# Patient Record
Sex: MALE | Race: WHITE | NOT HISPANIC OR LATINO | URBAN - METROPOLITAN AREA
[De-identification: names, ages, dates, MRNs, and addresses within clinical notes are randomized per-mention and may not be internally consistent; named-entity substitution may affect disease eponyms.]

---

## 2017-04-13 ENCOUNTER — APPOINTMENT (RX ONLY)
Dept: URBAN - METROPOLITAN AREA CLINIC 51 | Facility: CLINIC | Age: 47
Setting detail: DERMATOLOGY
End: 2017-04-13

## 2017-04-13 DIAGNOSIS — L57.0 ACTINIC KERATOSIS: ICD-10-CM

## 2017-04-13 PROBLEM — E78.5 HYPERLIPIDEMIA, UNSPECIFIED: Status: ACTIVE | Noted: 2017-04-13

## 2017-04-13 PROBLEM — J30.1 ALLERGIC RHINITIS DUE TO POLLEN: Status: ACTIVE | Noted: 2017-04-13

## 2017-04-13 PROCEDURE — ? DIAGNOSIS COMMENT

## 2017-04-13 PROCEDURE — ? COUNSELING

## 2017-04-13 PROCEDURE — 17000 DESTRUCT PREMALG LESION: CPT

## 2017-04-13 PROCEDURE — ? LIQUID NITROGEN

## 2017-04-13 ASSESSMENT — LOCATION DETAILED DESCRIPTION DERM: LOCATION DETAILED: LEFT MEDIAL SUPERIOR CHEST

## 2017-04-13 ASSESSMENT — LOCATION SIMPLE DESCRIPTION DERM: LOCATION SIMPLE: CHEST

## 2017-04-13 ASSESSMENT — LOCATION ZONE DERM: LOCATION ZONE: TRUNK

## 2017-04-13 NOTE — PROCEDURE: LIQUID NITROGEN
Render Post-Care Instructions In Note?: yes
Duration Of Freeze Thaw-Cycle (Seconds): 2
Consent: The patient's consent was obtained including but not limited to risks of crusting, scabbing, blistering, scarring, darker or lighter pigmentary change, recurrence, incomplete removal and infection.
Number Of Freeze-Thaw Cycles: 2 freeze-thaw cycles
Detail Level: Detailed
Post-Care Instructions: I reviewed with the patient in detail post-care instructions. Patient is to wear sunprotection, and avoid picking at any of the treated lesions. Pt may apply Vaseline to crusted or scabbing areas.

## 2018-07-18 ENCOUNTER — APPOINTMENT (RX ONLY)
Dept: URBAN - METROPOLITAN AREA CLINIC 51 | Facility: CLINIC | Age: 48
Setting detail: DERMATOLOGY
End: 2018-07-18

## 2018-07-18 DIAGNOSIS — L82.1 OTHER SEBORRHEIC KERATOSIS: ICD-10-CM

## 2018-07-18 DIAGNOSIS — L57.0 ACTINIC KERATOSIS: ICD-10-CM

## 2018-07-18 PROCEDURE — 17000 DESTRUCT PREMALG LESION: CPT

## 2018-07-18 PROCEDURE — ? COUNSELING

## 2018-07-18 PROCEDURE — 99212 OFFICE O/P EST SF 10 MIN: CPT | Mod: 25

## 2018-07-18 PROCEDURE — ? LIQUID NITROGEN

## 2018-07-18 ASSESSMENT — LOCATION SIMPLE DESCRIPTION DERM
LOCATION SIMPLE: LEFT EAR
LOCATION SIMPLE: LEFT PRETIBIAL REGION

## 2018-07-18 ASSESSMENT — LOCATION ZONE DERM
LOCATION ZONE: EAR
LOCATION ZONE: LEG

## 2018-07-18 ASSESSMENT — LOCATION DETAILED DESCRIPTION DERM
LOCATION DETAILED: LEFT SUPERIOR HELIX
LOCATION DETAILED: LEFT LATERAL PROXIMAL PRETIBIAL REGION

## 2018-07-18 ASSESSMENT — PAIN INTENSITY VAS: HOW INTENSE IS YOUR PAIN 0 BEING NO PAIN, 10 BEING THE MOST SEVERE PAIN POSSIBLE?: NO PAIN

## 2018-07-18 NOTE — PROCEDURE: LIQUID NITROGEN
Duration Of Freeze Thaw-Cycle (Seconds): 5
Detail Level: Zone
Number Of Freeze-Thaw Cycles: 2 freeze-thaw cycles
Consent: The patient's verbal consent was obtained including but not limited to risks of crusting, scabbing, blistering, scarring, darker or lighter pigmentary change, recurrence, incomplete removal and infection.
Render Post-Care Instructions In Note?: yes
Post-Care Instructions: I reviewed with the patient in detail post-care instructions. Patient is to wear sunprotection, and avoid picking at any of the treated lesions. Pt may shower as normal, and apply Vaseline to crusted or scabbing areas.

## 2019-12-17 ENCOUNTER — APPOINTMENT (RX ONLY)
Dept: URBAN - METROPOLITAN AREA CLINIC 51 | Facility: CLINIC | Age: 49
Setting detail: DERMATOLOGY
End: 2019-12-17

## 2019-12-17 DIAGNOSIS — L28.0 LICHEN SIMPLEX CHRONICUS: ICD-10-CM

## 2019-12-17 DIAGNOSIS — L81.4 OTHER MELANIN HYPERPIGMENTATION: ICD-10-CM

## 2019-12-17 PROCEDURE — ? COUNSELING

## 2019-12-17 PROCEDURE — 99213 OFFICE O/P EST LOW 20 MIN: CPT

## 2019-12-17 PROCEDURE — ? PRESCRIPTION

## 2019-12-17 ASSESSMENT — LOCATION ZONE DERM
LOCATION ZONE: TRUNK
LOCATION ZONE: LEG

## 2019-12-17 ASSESSMENT — SEVERITY ASSESSMENT: SEVERITY: MILD

## 2019-12-17 ASSESSMENT — LOCATION SIMPLE DESCRIPTION DERM
LOCATION SIMPLE: RIGHT UPPER BACK
LOCATION SIMPLE: LEFT KNEE

## 2019-12-17 ASSESSMENT — LOCATION DETAILED DESCRIPTION DERM
LOCATION DETAILED: RIGHT SUPERIOR MEDIAL UPPER BACK
LOCATION DETAILED: LEFT KNEE

## 2020-12-17 ENCOUNTER — APPOINTMENT (RX ONLY)
Dept: URBAN - METROPOLITAN AREA CLINIC 51 | Facility: CLINIC | Age: 50
Setting detail: DERMATOLOGY
End: 2020-12-17

## 2020-12-17 DIAGNOSIS — L82.1 OTHER SEBORRHEIC KERATOSIS: ICD-10-CM

## 2020-12-17 DIAGNOSIS — L81.4 OTHER MELANIN HYPERPIGMENTATION: ICD-10-CM

## 2020-12-17 PROCEDURE — ? COUNSELING

## 2020-12-17 PROCEDURE — 99213 OFFICE O/P EST LOW 20 MIN: CPT

## 2020-12-17 ASSESSMENT — PAIN INTENSITY VAS: HOW INTENSE IS YOUR PAIN 0 BEING NO PAIN, 10 BEING THE MOST SEVERE PAIN POSSIBLE?: NO PAIN

## 2020-12-17 ASSESSMENT — LOCATION SIMPLE DESCRIPTION DERM
LOCATION SIMPLE: CHEST
LOCATION SIMPLE: LEFT UPPER ARM
LOCATION SIMPLE: RIGHT SHOULDER
LOCATION SIMPLE: LEFT SHOULDER
LOCATION SIMPLE: RIGHT FOREARM

## 2020-12-17 ASSESSMENT — LOCATION DETAILED DESCRIPTION DERM
LOCATION DETAILED: LEFT PROXIMAL POSTERIOR UPPER ARM
LOCATION DETAILED: STERNUM
LOCATION DETAILED: LEFT POSTERIOR SHOULDER
LOCATION DETAILED: RIGHT PROXIMAL DORSAL FOREARM
LOCATION DETAILED: RIGHT POSTERIOR SHOULDER

## 2020-12-17 ASSESSMENT — LOCATION ZONE DERM
LOCATION ZONE: TRUNK
LOCATION ZONE: ARM

## 2021-12-07 ENCOUNTER — HOSPITAL ENCOUNTER (OUTPATIENT)
Dept: HOSPITAL 35 - CAT | Age: 51
End: 2021-12-07
Payer: COMMERCIAL

## 2021-12-07 DIAGNOSIS — I25.10: ICD-10-CM

## 2021-12-07 DIAGNOSIS — E78.00: ICD-10-CM

## 2021-12-07 DIAGNOSIS — Z13.6: Primary | ICD-10-CM

## 2022-05-14 ENCOUNTER — HOSPITAL ENCOUNTER (EMERGENCY)
Dept: HOSPITAL 75 - ER | Age: 52
Discharge: HOME | End: 2022-05-14
Payer: COMMERCIAL

## 2022-05-14 VITALS — DIASTOLIC BLOOD PRESSURE: 79 MMHG | SYSTOLIC BLOOD PRESSURE: 135 MMHG

## 2022-05-14 VITALS — BODY MASS INDEX: 28.72 KG/M2 | HEIGHT: 66.93 IN | WEIGHT: 182.98 LBS

## 2022-05-14 DIAGNOSIS — N20.1: Primary | ICD-10-CM

## 2022-05-14 LAB
ALBUMIN SERPL-MCNC: 4.5 GM/DL (ref 3.2–4.5)
ALP SERPL-CCNC: 70 U/L (ref 40–136)
ALT SERPL-CCNC: 46 U/L (ref 0–55)
AMYLASE SERPL-CCNC: 61 U/L (ref 25–125)
APTT PPP: YELLOW S
BACTERIA #/AREA URNS HPF: (no result) /HPF
BARBITURATES UR QL: NEGATIVE
BASOPHILS # BLD AUTO: 0.1 10^3/UL (ref 0–0.1)
BASOPHILS NFR BLD AUTO: 1 % (ref 0–10)
BENZODIAZ UR QL SCN: NEGATIVE
BILIRUB SERPL-MCNC: 0.5 MG/DL (ref 0.1–1)
BILIRUB UR QL STRIP: NEGATIVE
BUN/CREAT SERPL: 15
CALCIUM SERPL-MCNC: 9.6 MG/DL (ref 8.5–10.1)
CHLORIDE SERPL-SCNC: 103 MMOL/L (ref 98–107)
CO2 SERPL-SCNC: 24 MMOL/L (ref 21–32)
COCAINE UR QL: NEGATIVE
CREAT SERPL-MCNC: 1.15 MG/DL (ref 0.6–1.3)
EOSINOPHIL # BLD AUTO: 0.1 10^3/UL (ref 0–0.3)
EOSINOPHIL NFR BLD AUTO: 1 % (ref 0–10)
FIBRINOGEN PPP-MCNC: CLEAR MG/DL
GFR SERPLBLD BASED ON 1.73 SQ M-ARVRAT: 78 ML/MIN
GLUCOSE SERPL-MCNC: 127 MG/DL (ref 70–105)
GLUCOSE UR STRIP-MCNC: NEGATIVE MG/DL
HCT VFR BLD CALC: 43 % (ref 40–54)
HGB BLD-MCNC: 14.4 G/DL (ref 13.3–17.7)
HYALINE CASTS #/AREA URNS LPF: (no result) /LPF
KETONES UR QL STRIP: NEGATIVE
LEUKOCYTE ESTERASE UR QL STRIP: NEGATIVE
LIPASE SERPL-CCNC: 42 U/L (ref 8–78)
LYMPHOCYTES # BLD AUTO: 2.6 10^3/UL (ref 1–4)
LYMPHOCYTES NFR BLD AUTO: 30 % (ref 12–44)
MANUAL DIFFERENTIAL PERFORMED BLD QL: NO
MCH RBC QN AUTO: 28 PG (ref 25–34)
MCHC RBC AUTO-ENTMCNC: 33 G/DL (ref 32–36)
MCV RBC AUTO: 85 FL (ref 80–99)
METHADONE UR QL SCN: NEGATIVE
MONOCYTES # BLD AUTO: 0.7 10^3/UL (ref 0–1)
MONOCYTES NFR BLD AUTO: 9 % (ref 0–12)
NEUTROPHILS # BLD AUTO: 5.1 10^3/UL (ref 1.8–7.8)
NEUTROPHILS NFR BLD AUTO: 60 % (ref 42–75)
NITRITE UR QL STRIP: NEGATIVE
OPIATES UR QL SCN: NEGATIVE
OXYCODONE UR QL: NEGATIVE
PH UR STRIP: 6 [PH] (ref 5–9)
PLATELET # BLD: 208 10^3/UL (ref 130–400)
PMV BLD AUTO: 9.7 FL (ref 9–12.2)
POTASSIUM SERPL-SCNC: 3.7 MMOL/L (ref 3.6–5)
PROPOXYPH UR QL: NEGATIVE
PROT SERPL-MCNC: 7.4 GM/DL (ref 6.4–8.2)
PROT UR QL STRIP: (no result)
RBC #/AREA URNS HPF: (no result) /HPF
SODIUM SERPL-SCNC: 143 MMOL/L (ref 135–145)
SP GR UR STRIP: >=1.03 (ref 1.02–1.02)
SQUAMOUS #/AREA URNS HPF: (no result) /HPF
TRICYCLICS UR QL SCN: NEGATIVE
WBC # BLD AUTO: 8.6 10^3/UL (ref 4.3–11)
WBC #/AREA URNS HPF: (no result) /HPF

## 2022-05-14 PROCEDURE — 36415 COLL VENOUS BLD VENIPUNCTURE: CPT

## 2022-05-14 PROCEDURE — 83690 ASSAY OF LIPASE: CPT

## 2022-05-14 PROCEDURE — 80053 COMPREHEN METABOLIC PANEL: CPT

## 2022-05-14 PROCEDURE — 80306 DRUG TEST PRSMV INSTRMNT: CPT

## 2022-05-14 PROCEDURE — 74018 RADEX ABDOMEN 1 VIEW: CPT

## 2022-05-14 PROCEDURE — 85025 COMPLETE CBC W/AUTO DIFF WBC: CPT

## 2022-05-14 PROCEDURE — 74176 CT ABD & PELVIS W/O CONTRAST: CPT

## 2022-05-14 PROCEDURE — 82150 ASSAY OF AMYLASE: CPT

## 2022-05-14 PROCEDURE — 81000 URINALYSIS NONAUTO W/SCOPE: CPT

## 2022-05-14 PROCEDURE — 87088 URINE BACTERIA CULTURE: CPT

## 2022-05-14 NOTE — ED BACK PAIN
General


Chief Complaint:  General Problems/Pain


Stated Complaint:  L SIDE LOWER BACK PAIN


Nursing Triage Note:  


PT REPORTS TO ED FOR LT SIDED FLANK PAIN THAT SUDDENLY STARTED WHILE DRIVING. 


DENIES KNOWN INJURY. STATES HE FELT LIKE PASSING OUT AND HIS VISION GOT FOGGY. 


PT REPORTS CURRENTLY FEELING BETTER THAN EARLIER. PT AMB. TO FT2 WITHOUT 


DIFFICULTY. SPOUSE AT BEDSIDE.


Source of Information:  Patient, Spouse





History of Present Illness


Date Seen by Provider:  May 14, 2022


Time Seen by Provider:  20:00


Initial Comments


PT ARRIVES VIA POV WITH WIFE


AN  HOUR AGO, WHILE DRIVING, PT HAD SUDDEN ONSET OF SEVERE LEFT FLANK PAIN 


PAIN COMES IN WAVES--RATES PAIN 10/10


NO RADIATION OF PAIN 


NOTHING WORSENS OR IMPROVES PAIN 


HAS NOT TAKEN ANYTHING FOR PAIN 


FELT LIKE HE MIGHT PASS OUT WHEN PAIN WAS SEVERE


+ NAUSEA, NO VOMITING


NO URINARY SYMPTOMS, BUT UNABLE TO VOID ON ARRIVAL


NO FEVER





NO HISTORY OF SIMILAR





PT FELT FINE ALL DAY TODAY


HAVE BEEN WATCHING SOCCER GAMES ALL DAY TODAY--VERY HOT OUTSIDE. 


HAS BEEN DRINKING WATER TODAY AND VOIDING A NORMAL AMOUNT. 





OTHER THAN HIGH CHOLESTEROL, NO CHRONIC ILLNESS


NO PRIOR ABDOMINAL SURGERIES


Other Comments





PCP: [facility]  WITH [facility] IN [place], [place]--PT LIVES IN [place]





Allergies and Home Medications


Allergies


Coded Allergies:  


     No Known Drug Allergies (Unverified , 5/14/22)





Patient Home Medication List


Home Medication List Reviewed:  Yes


Hydrocodone Bit/Acetaminophen (HYDROcodone/APAP 7.5/325 TAB) 1 Ea Tablet, 1 EA 

PO Q4-6 PRN for PAIN


   Prescribed by: MARCIA ACEVEDO on 5/14/22 2122


Ketorolac Tromethamine (Ketorolac Tromethamine) 10 Mg Tablet, 10 MG PO Q6H


   Prescribed by: MARCIA ACEVEDO on 5/14/22 2122


Levofloxacin (Levofloxacin) 500 Mg Tablet, 500 MG PO DAILY


   Prescribed by: MARCIA ACEVEDO on 5/14/22 2122


Ondansetron (Ondansetron Odt) 8 Mg Tab.rapdis, 8 MG PO Q4H PRN for 

NAUSEA/VOMITING


   Prescribed by: MARCIA ACEVEDO on 5/14/22 2122


Tamsulosin HCl (Flomax) 0.4 Mg Cap, 0.4 MG PO DAILY


   Prescribed by: MARCIA ACEVEDO on 5/14/22 2122





Review of Systems


Constitutional:  see HPI


Respiratory:  no symptoms reported


Cardiovascular:  no symptoms reported


Gastrointestinal:  see HPI


Genitourinary:  no symptoms reported


Musculoskeletal:  see HPI, back pain


Skin:  no symptoms reported


Psychiatric/Neurological:  No Symptoms Reported





Past Medical-Social-Family Hx


Patient Social History


Tobacco Use?:  No


Substance use?:  No


Alcohol Use?:  Yes


Alcohol type:  Beer


Alcohol Frequency:  Rarely


Pt feels they are or have been:  No





Immunizations Up To Date


First/Initial COVID19 Vaccinat:  02/06/21


Second COVID19 Vaccination Arian:  3/29/21


COVID19 Vaccine :  MODERNA





Past Medical History


Surgery/Hospitalization HX:  


PMH; CHOLESTEROL.





SURGERY; SHOULDER AND KNEE SURGERIES.


Surgeries:  Yes


Orthopedic


Respiratory:  No


Cardiac:  Yes


High Cholesterol


Neurological:  No


Reproductive Disorders:  No


Genitourinary:  No


Gastrointestinal:  No


Musculoskeletal:  Yes (SHOULDER AND KNEE SURGERIES)


Endocrine:  No


HEENT:  No


Cancer:  No


Psychosocial:  No


Integumentary:  No


Blood Disorders:  No





Physical Exam


Vital Signs





Vital Signs - First Documented








 5/14/22





 19:48


 


Temp 35.8


 


Pulse 63


 


Resp 18


 


B/P (MAP) 150/89 (109)


 


Pulse Ox 97


 


O2 Delivery Room Air





Capillary Refill : Less Than 3 Seconds


Height, Weight, BMI


Height: '"


Weight: lbs. oz. kg; 28.00 BMI


Method:


General Appearance:  WD/WN, Other (WAILING, MOANING, RESTLESS/WRITHING, HOLDING 

LEFT FLANK. )


Neck:  Normal Inspection


Cardiovascular:  Regular Rate, Rhythm, No Murmur


Respiratory:  Normal Breath Sounds


Gastrointestinal:  Normal Bowel Sounds, No Organomegaly, Non Tender, Soft


Back:  No CVA Tenderness, No Vertebral Tenderness; No CVA Tenderness (L)


Extremity:  Normal Range of Motion


Neurologic/Psychiatric:  Alert, Oriented x3, No Motor/Sensory Deficits, CNs II-

XII Norm as Tested


Skin:  Normal Color, Warm/Dry; No Rash





Progress/Results/Core Measures


Results/Orders


Lab Results





Laboratory Tests








Test


 5/14/22


20:05 5/14/22


20:39 Range/Units


 


 


White Blood Count


 8.6 


 


 4.3-11.0


10^3/uL


 


Red Blood Count


 5.09 


 


 4.30-5.52


10^6/uL


 


Hemoglobin 14.4   13.3-17.7  g/dL


 


Hematocrit 43   40-54  %


 


Mean Corpuscular Volume 85   80-99  fL


 


Mean Corpuscular Hemoglobin 28   25-34  pg


 


Mean Corpuscular Hemoglobin


Concent 33 


 


 32-36  g/dL





 


Red Cell Distribution Width 13.1   10.0-14.5  %


 


Platelet Count


 208 


 


 130-400


10^3/uL


 


Mean Platelet Volume 9.7   9.0-12.2  fL


 


Immature Granulocyte % (Auto) 0    %


 


Neutrophils (%) (Auto) 60   42-75  %


 


Lymphocytes (%) (Auto) 30   12-44  %


 


Monocytes (%) (Auto) 9   0-12  %


 


Eosinophils (%) (Auto) 1   0-10  %


 


Basophils (%) (Auto) 1   0-10  %


 


Neutrophils # (Auto)


 5.1 


 


 1.8-7.8


10^3/uL


 


Lymphocytes # (Auto)


 2.6 


 


 1.0-4.0


10^3/uL


 


Monocytes # (Auto)


 0.7 


 


 0.0-1.0


10^3/uL


 


Eosinophils # (Auto)


 0.1 


 


 0.0-0.3


10^3/uL


 


Basophils # (Auto)


 0.1 


 


 0.0-0.1


10^3/uL


 


Immature Granulocyte # (Auto)


 0.0 


 


 0.0-0.1


10^3/uL


 


Sodium Level 143   135-145  MMOL/L


 


Potassium Level 3.7   3.6-5.0  MMOL/L


 


Chloride Level 103     MMOL/L


 


Carbon Dioxide Level 24   21-32  MMOL/L


 


Anion Gap 16 H  5-14  MMOL/L


 


Blood Urea Nitrogen 17   7-18  MG/DL


 


Creatinine


 1.15 


 


 0.60-1.30


MG/DL


 


Estimat Glomerular Filtration


Rate 78 


 


  





 


BUN/Creatinine Ratio 15    


 


Glucose Level 127 H    MG/DL


 


Calcium Level 9.6   8.5-10.1  MG/DL


 


Corrected Calcium 9.2   8.5-10.1  MG/DL


 


Total Bilirubin 0.5   0.1-1.0  MG/DL


 


Aspartate Amino Transf


(AST/SGOT) 43 H


 


 5-34  U/L





 


Alanine Aminotransferase


(ALT/SGPT) 46 


 


 0-55  U/L





 


Alkaline Phosphatase 70     U/L


 


Total Protein 7.4   6.4-8.2  GM/DL


 


Albumin 4.5   3.2-4.5  GM/DL


 


Amylase Level 61     U/L


 


Lipase 42   8-78  U/L


 


Urine Color  YELLOW   


 


Urine Clarity  CLEAR   


 


Urine pH  6.0  5-9  


 


Urine Specific Gravity  >=1.030  1.016-1.022  


 


Urine Protein  TRACE H NEGATIVE  


 


Urine Glucose (UA)  NEGATIVE  NEGATIVE  


 


Urine Ketones  NEGATIVE  NEGATIVE  


 


Urine Nitrite  NEGATIVE  NEGATIVE  


 


Urine Bilirubin  NEGATIVE  NEGATIVE  


 


Urine Urobilinogen  0.2  < = 1.0  MG/DL


 


Urine Leukocyte Esterase  NEGATIVE  NEGATIVE  


 


Urine RBC (Auto)  3+ H NEGATIVE  


 


Urine RBC  25-50 H  /HPF


 


Urine WBC  RARE   /HPF


 


Urine Squamous Epithelial


Cells 


 RARE 


  /HPF





 


Urine Crystals  NONE   /LPF


 


Urine Bacteria  FEW H  /HPF


 


Urine Casts  PRESENT   /LPF


 


Urine Hyaline Casts  0-2 H  /LPF


 


Urine Mucus  MODERATE H  /LPF


 


Urine Culture Indicated  YES   


 


Urine Opiates Screen  NEGATIVE  NEGATIVE  


 


Urine Oxycodone Screen  NEGATIVE  NEGATIVE  


 


Urine Methadone Screen  NEGATIVE  NEGATIVE  


 


Urine Propoxyphene Screen  NEGATIVE  NEGATIVE  


 


Urine Barbiturates Screen  NEGATIVE  NEGATIVE  


 


Ur Tricyclic Antidepressants


Screen 


 NEGATIVE 


 NEGATIVE  





 


Urine Phencyclidine Screen  NEGATIVE  NEGATIVE  


 


Urine Amphetamines Screen  NEGATIVE  NEGATIVE  


 


Urine Methamphetamines Screen  NEGATIVE  NEGATIVE  


 


Urine Benzodiazepines Screen  NEGATIVE  NEGATIVE  


 


Urine Cocaine Screen  NEGATIVE  NEGATIVE  


 


Urine Cannabinoids Screen  NEGATIVE  NEGATIVE  








My Orders





Orders - MARCIA AECVEDO DO


Ed Iv/Invasive Line Start (5/14/22 20:01)


Ct Abd/Pelvis Wo(Kidney Stone) (5/14/22 20:01)


Abdomen/Kub 1view (5/14/22 20:01)


Amylase (5/14/22 20:01)


Cbc With Automated Diff (5/14/22 20:01)


Comprehensive Metabolic Panel (5/14/22 20:01)


Drug Screen Stat (Urine) (5/14/22 20:01)


Lipase (5/14/22 20:01)


Ua Culture If Indicated (5/14/22 20:01)


Ed Iv/Invasive Line Start (5/14/22 20:05)


Lactated Ringers (Lr 1000 Ml Iv Solution (5/14/22 20:15)


Ondansetron Injection (Zofran Injectio (5/14/22 20:15)


Ketorolac Injection (Toradol Injection) (5/14/22 20:05)


Tamsulosin Capsule (Flomax Capsule) (5/14/22 21:15)


Urine Culture (5/14/22 20:39)


Rx-Ondansetron Po (Rx-Zofran Po) (5/14/22 21:33)


Rx-Hydrocodone/Apap 5-325 Mg (Rx-Vicodin (5/14/22 21:45)


Ondansetron  Oral Dissolve Tab (Zofran (5/14/22 21:54)





Medications Given in ED





Current Medications








 Medications  Dose


 Ordered  Sig/Sarah


 Route  Start Time


 Stop Time Status Last Admin


Dose Admin


 


 Acetaminophen/


 Hydrocodone Bitart  1 ea  Q4H  PRN


 PO  5/14/22 21:45


 5/14/22 22:28 DC 5/14/22 22:14


1 EA


 


 Lactated Ringer's  1,000 ml @ 


 0 mls/hr  Q0M ONCE


 IV  5/14/22 20:15


 5/14/22 20:16 DC 5/14/22 20:12


1,000 MLS/HR


 


 Ondansetron HCl  4 mg  ONCE  ONCE


 IVP  5/14/22 20:15


 5/14/22 20:16 DC 5/14/22 20:12


4 MG








Vital Signs/I&O











 5/14/22 5/14/22





 19:48 22:28


 


Temp 35.8 36.0


 


Pulse 63 60


 


Resp 18 16


 


B/P (MAP) 150/89 (109) 135/79


 


Pulse Ox 97 98


 


O2 Delivery Room Air Room Air














 5/15/22





 00:00


 


Intake Total 1000 ml


 


Balance 1000 ml














Blood Pressure Mean:                    109











Progress


Progress Note :  


Progress Note


GIVEN IV FLUIDS, ZOFRAN AND TORADOL


NAUSEA AND PAIN IMPROVED, RATES PAIN 5/10 AFTER TORADOL


PT DECLINES ANY OTHER MEDICATIONS FOR PAIN OR NAUSEA


DID EVENTUALLY VOMIT AND STATES HE FEELS MUCH BETTER--PAIN AND NAUSEA ARE GONE 

AT DISMISSAL. 





GIVEN FLOMAX AND PT KEPT IT DOWN.





Diagnostic Imaging





Comments


PER RADIOLOGIST REPORTS AT 2131





KUB--FINDINGS: This single limited imaging of the lower abdomen


extends to above the L4 vertebral body. Just lateral to the


inferior aspect of the L4 vertebral body is a small 3 mm


calcification which corresponds to the left ureteric stone on


renal colic CT.





IMPRESSION: Limited imaging of the lower abdomen and pelvis


demonstrates visualization of an approximately 3 mm stone just


lateral to the L4 vertebral body.








CT ABDOMEN/PELVIS--FINDINGS:


LOWER THORAX: Clear.





LIVER: Unremarkable.


GALLBLADDER: Present and unremarkable. No bile duct dilatation.


SPLEEN: Unremarkable.


PANCREAS: Unremarkable.


ADRENAL GLANDS: Unremarkable.


KIDNEYS: Right kidney contains a small 2 mm stone in inferior


pole. Collecting system unremarkable. Left kidney is engorged and


slightly asymmetrically enlarged. There is a 4 mm calcification


in the left mid ureter, approximately L4-L5 disc space. Distal


ureter is otherwise unremarkable.


ABDOMINAL AORTA: Unremarkable, nonaneurysmal.





GASTROINTESTINAL TRACT: Stomach is distended with retained


gastric contents and fluid. No small bowel obstruction. Mild


stool in the colon. Normal appendix. No abdominal ascites and/or


free air.





URINARY BLADDER: Decompressed.


REPRODUCTIVE: Unremarkable.





OSSEOUS STRUCTURES: No acute abnormality.





OTHER: None.





IMPRESSION:


1. 4 mm stone in left mid ureter results in currently mild to


moderate obstruction.


2. Small obstructing 2 mm stone in inferior pole of right kidney.


   Reviewed:  Reviewed by Me





Departure


Communication (Admissions)


2115--CALLED [facility] ( PT'S PREFERRED [facility] --AS IT IS COVERED ON HIS 

INSURANCE AND PT LIVES IN [place]) AND WAS GIVEN NAME OF UROLOGIST ON CALL, FOR 

PT TO FOLLOW UP WITH THIS WEEK. DR. GLORIA GONZALEZ [phone]





Impression





   Primary Impression:  


   Left ureteral stone


   Additional Impression:  


   Urinary tract infection


Disposition:  01 HOME, SELF-CARE


Condition:  Improved





Departure-Patient Inst.


Decision time for Depature:  21:32


Referrals:  


NO,LOCAL PHYSICIAN (PCP/Family)


Primary Care Physician


Patient Instructions:  Kidney Stones (DC), How to Strain Your Urine, Kidney 

Stone Diet





Add. Discharge Instructions:  


LOTS OF CLEAR LIQUIDS--NO COFFEE, POP OR TEA





STRAIN ALL URINE--RETURN ANY STONES TO UROLOGIST





FOLLOW UP WITH [facility] UROLOGIST, DR. GLORIA GONZALEZ, ON MONDAY FOR FURTHER 

CARE. [phone]


GO TO NEAREST ER IF SYMPTOMS WORSEN





All discharge instructions reviewed with patient and/or family. Voiced 

understanding.


Scripts


Ondansetron (Ondansetron Odt) 8 Mg Tab.rapdis


8 MG PO Q4H PRN for NAUSEA/VOMITING, #10 TAB


   Prov: MARCIA ACEVEDO DO         5/14/22 


Ketorolac Tromethamine (Ketorolac Tromethamine) 10 Mg Tablet


10 MG PO Q6H for Pain, #15 TAB


   Prov: MARCIA ACEVEDO DO         5/14/22 


Hydrocodone Bit/Acetaminophen (HYDROcodone/APAP 7.5/325 TAB) 1 Ea Tablet


1 EA PO Q4-6 PRN for PAIN, #20 TAB


   Prov: MARCIA ACEVEDO DO         5/14/22 


Tamsulosin HCl (Flomax) 0.4 Mg Cap


0.4 MG PO DAILY, #10 CAP


   Prov: MARCIA ACEVEDO DO         5/14/22 


Levofloxacin (Levofloxacin) 500 Mg Tablet


500 MG PO DAILY, #5 TAB 0 Refills


   Prov: MARCIA ACEVEDO DO         5/14/22











MARCIA ACEVEDO DO                 May 14, 2022 20:09

## 2022-05-14 NOTE — DIAGNOSTIC IMAGING REPORT
INDICATION: Left flank pain.



TECHNIQUE: Single view of the abdomen 9:01 PM.



CORRELATION STUDY: None.



FINDINGS: This single limited imaging of the lower abdomen

extends to above the L4 vertebral body. Just lateral to the

inferior aspect of the L4 vertebral body is a small 3 mm

calcification which corresponds to the left ureteric stone on

renal colic CT.



IMPRESSION: Limited imaging of the lower abdomen and pelvis

demonstrates visualization of an approximately 3 mm stone just

lateral to the L4 vertebral body.



Dictated by: 



  Dictated on workstation # [id number]

## 2022-05-14 NOTE — DIAGNOSTIC IMAGING REPORT
PROCEDURE: CT urinary tract, rule out kidney stone.



TECHNIQUE: Multiple contiguous axial images were obtained through

the abdomen and pelvis without the use of intravenous contrast.

Auto Exposure Controls were utilized during the CT exam to meet

ALARA standards for radiation dose reduction. 



INDICATION: 51-year-old male, left flank pain.



CORRELATION STUDY: None.



FINDINGS:

LOWER THORAX: Clear.



LIVER: Unremarkable.

GALLBLADDER: Present and unremarkable. No bile duct dilatation.

SPLEEN: Unremarkable.

PANCREAS: Unremarkable.

ADRENAL GLANDS: Unremarkable.

KIDNEYS: Right kidney contains a small 2 mm stone in inferior

pole. Collecting system unremarkable. Left kidney is engorged and

slightly asymmetrically enlarged. There is a 4 mm calcification

in the left mid ureter, approximately L4-L5 disc space. Distal

ureter is otherwise unremarkable.

ABDOMINAL AORTA: Unremarkable, nonaneurysmal.



GASTROINTESTINAL TRACT: Stomach is distended with retained

gastric contents and fluid. No small bowel obstruction. Mild

stool in the colon. Normal appendix. No abdominal ascites and/or

free air.



URINARY BLADDER: Decompressed.

REPRODUCTIVE: Unremarkable.



OSSEOUS STRUCTURES: No acute abnormality.



OTHER: None.



IMPRESSION:

1. 4 mm stone in left mid ureter results in currently mild to

moderate obstruction.

2. Small obstructing 2 mm stone in inferior pole of right kidney.















Dictated by: 



  Dictated on workstation # [id number]

## 2025-04-10 ENCOUNTER — APPOINTMENT (OUTPATIENT)
Dept: URBAN - METROPOLITAN AREA CLINIC 51 | Facility: CLINIC | Age: 55
Setting detail: DERMATOLOGY
End: 2025-04-10

## 2025-04-10 DIAGNOSIS — L57.0 ACTINIC KERATOSIS: ICD-10-CM | Status: WORSENING

## 2025-04-10 DIAGNOSIS — L82.0 INFLAMED SEBORRHEIC KERATOSIS: ICD-10-CM | Status: WORSENING

## 2025-04-10 DIAGNOSIS — L82.1 OTHER SEBORRHEIC KERATOSIS: ICD-10-CM | Status: STABLE

## 2025-04-10 DIAGNOSIS — L81.4 OTHER MELANIN HYPERPIGMENTATION: ICD-10-CM | Status: STABLE

## 2025-04-10 DIAGNOSIS — B07.8 OTHER VIRAL WARTS: ICD-10-CM | Status: WORSENING

## 2025-04-10 PROCEDURE — ? LIQUID NITROGEN

## 2025-04-10 PROCEDURE — 17000 DESTRUCT PREMALG LESION: CPT | Mod: 59

## 2025-04-10 PROCEDURE — 17003 DESTRUCT PREMALG LES 2-14: CPT | Mod: 59

## 2025-04-10 PROCEDURE — 17110 DESTRUCTION B9 LES UP TO 14: CPT

## 2025-04-10 PROCEDURE — ? COUNSELING

## 2025-04-10 PROCEDURE — 99203 OFFICE O/P NEW LOW 30 MIN: CPT | Mod: 25

## 2025-04-10 ASSESSMENT — LOCATION SIMPLE DESCRIPTION DERM
LOCATION SIMPLE: LEFT ANTERIOR NECK
LOCATION SIMPLE: LEFT SHOULDER
LOCATION SIMPLE: LEFT ZYGOMA
LOCATION SIMPLE: RIGHT SHOULDER
LOCATION SIMPLE: LEFT FOOT
LOCATION SIMPLE: LEFT FOREARM
LOCATION SIMPLE: RIGHT FOREARM
LOCATION SIMPLE: CHEST

## 2025-04-10 ASSESSMENT — LOCATION DETAILED DESCRIPTION DERM
LOCATION DETAILED: LEFT LATERAL ACHILLES SKIN
LOCATION DETAILED: LEFT CLAVICULAR NECK
LOCATION DETAILED: LEFT MEDIAL ZYGOMA
LOCATION DETAILED: LEFT POSTERIOR SHOULDER
LOCATION DETAILED: LEFT DISTAL DORSAL FOREARM
LOCATION DETAILED: LEFT PROXIMAL DORSAL FOREARM
LOCATION DETAILED: RIGHT PROXIMAL DORSAL FOREARM
LOCATION DETAILED: STERNUM
LOCATION DETAILED: RIGHT POSTERIOR SHOULDER

## 2025-04-10 ASSESSMENT — LOCATION ZONE DERM
LOCATION ZONE: FEET
LOCATION ZONE: FACE
LOCATION ZONE: TRUNK
LOCATION ZONE: ARM
LOCATION ZONE: NECK

## 2025-04-10 ASSESSMENT — PAIN INTENSITY VAS: HOW INTENSE IS YOUR PAIN 0 BEING NO PAIN, 10 BEING THE MOST SEVERE PAIN POSSIBLE?: NO PAIN

## 2025-04-10 NOTE — PROCEDURE: LIQUID NITROGEN
Detail Level: Detailed
Render Note In Bullet Format When Appropriate: No
Number Of Freeze-Thaw Cycles: 2 freeze-thaw cycles
Show Applicator Variable?: Yes
Duration Of Freeze Thaw-Cycle (Seconds): 5
Application Tool (Optional): Liquid Nitrogen Sprayer
Post-Care Instructions: I reviewed with the patient in detail post-care instructions. Patient is to wear sunprotection, and avoid picking at any of the treated lesions. Pt may shower as normal, and apply Vaseline to crusted or scabbing areas.
Consent: The patient's verbal consent was obtained including but not limited to risks of crusting, scabbing, blistering, scarring, darker or lighter pigmentary change, recurrence, incomplete removal and infection.
Medical Necessity Clause: Rubbing on hearing aid
Post-Care Instructions: I reviewed with the patient in detail post-care instructions. Patient is to wear sunprotection, and avoid picking at any of the treated lesions. Pt may apply Vaseline to crusted or scabbing areas.
Duration Of Freeze Thaw-Cycle (Seconds): 5-10
Spray Paint Text: The liquid nitrogen was applied to the skin utilizing a spray paint frosting technique.
Consent: The patient's consent was obtained including but not limited to risks of crusting, scabbing, blistering, scarring, darker or lighter pigmentary change, recurrence, incomplete removal and infection.
Medical Necessity Information: It is in your best interest to select a reason for this procedure from the list below. All of these items fulfill various CMS LCD requirements except the new and changing color options.